# Patient Record
Sex: MALE | Race: BLACK OR AFRICAN AMERICAN
[De-identification: names, ages, dates, MRNs, and addresses within clinical notes are randomized per-mention and may not be internally consistent; named-entity substitution may affect disease eponyms.]

---

## 2018-05-03 ENCOUNTER — HOSPITAL ENCOUNTER (EMERGENCY)
Dept: HOSPITAL 72 - EMR | Age: 73
Discharge: HOME | End: 2018-05-03
Payer: COMMERCIAL

## 2018-05-03 VITALS — DIASTOLIC BLOOD PRESSURE: 96 MMHG | SYSTOLIC BLOOD PRESSURE: 167 MMHG

## 2018-05-03 VITALS — WEIGHT: 200 LBS | BODY MASS INDEX: 28 KG/M2 | HEIGHT: 71 IN
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
## 2018-05-03 DIAGNOSIS — S56.912A: Primary | ICD-10-CM

## 2018-05-03 PROCEDURE — 99284 EMERGENCY DEPT VISIT MOD MDM: CPT

## 2018-05-03 NOTE — EMERGENCY ROOM REPORT
History of Present Illness


General


Chief Complaint:  Upper Extremity Injury


Source:  Patient


 (Eliza Jurado)





Present Illness


HPI


72-year-old male presents to the emergency department complaining of 6 out of 

10 in severity localized left forearm pain 2 days.  Patient reports acute 

onset of pain after having a left large load out of a truck on his own 2 days 

ago.  Patient denies bruising, swelling, skin color changes orders temperature 

changes of the extremity.  Patient denies pain elsewhere in the body such as 

chest or jaw.  He denies trauma or fall. Denies numbness tingling or loss of 

sensation or gross motor movements of the extremity. Denies CP, Palpitations, 

LOC, AMS, dizziness, Changes in Vision, Sensation, paresthesias, or a sudden 

severe headache. 


 (Eliza Jurado)


Allergies:  


Coded Allergies:  


     No Known Allergies (Unverified , 5/3/18)





Patient History


Past Medical History:  see triage record


Past Surgical History:  none


Pertinent Family History:  none


Reviewed Nursing Documentation:  PMH: Agreed; PSxH: Agreed (Eliza Jurado)





Nursing Documentation-PMH


Past Medical History:  No Stated History


 (Eliza Jurado)





Review of Systems


All Other Systems:  negative except mentioned in HPI


 (Eliza Jurado)





Physical Exam





Vital Signs








  Date Time  Temp Pulse Resp B/P (MAP) Pulse Ox O2 Delivery O2 Flow Rate FiO2


 


5/3/18 15:46 97.3 91 16 171/98 95 Room Air  





 97.3       








Sp02 EP Interpretation:  reviewed, normal


General Appearance:  no apparent distress, alert, GCS 15, non-toxic


Head:  normocephalic, atraumatic


ENT:  hearing grossly normal, normal voice


Neck:  full range of motion


Respiratory:  lungs clear, normal breath sounds, speaking full sentences


Cardiovascular #1:  regular rate, rhythm, normal capillary refill


Musculoskeletal:  back normal, gait/station normal, normal range of motion, 

tender - TTp to the left brachioradialis area. FROM against resistance with 

some illicitation of pain. normal distal radial pulse.


Neurologic:  alert, oriented x3, responsive, motor strength/tone normal, 

sensory intact, speech normal, grossly normal


Psychiatric:  judgement/insight normal


Skin:  normal color, no rash, warm/dry, well hydrated


Lymphatic:  no adenopathy


 (Eliza Jurado)





Medical Decision Making


PA Attestation


Dr. corcoran is my supervising Physician whom patient management has been 

discussed with. 


 (Eliza Jurado)


Medicare Attestation


The history of Vance Hull has been reviewed and management options for him 

have been examined and discussed by Torsten Coe. I have personally 

examined and interviewed the patient.





 (Torsten Coe MD)


Diagnostic Impression:  


 Primary Impression:  


 Muscle strain of left forearm


 Qualified Codes:  S56.912A - Strain of unspecified muscles, fascia and tendons 

at forearm level, left arm, initial encounter


ER Course


72-year-old male presents to the emergency department complaining of 6 out of 

10 in severity localized left forearm pain 2 days.  Patient reports acute 

onset of pain after having a left large load out of a truck on his own 2 days 

ago.  Patient denies bruising, swelling, skin color changes orders temperature 

changes of the extremity.  Patient denies pain elsewhere in the body such as 

chest or jaw.  He denies trauma or fall. Denies numbness tingling or loss of 

sensation or gross motor movements of the extremity. Denies CP, Palpitations, 

LOC, AMS, dizziness, Changes in Vision, Sensation, paresthesias, or a sudden 

severe headache. 








Ddx considered but are not limited to Fracture, dislocation, contusion,  Sprain/

Strain/Spasm, tendonitis, referred pain.





Vital signs: are WNL, pt. is afebrile


H&PE are most consistent with musculoskeletal injury will perform imaging to r/

o fractures/dislocations. 





ORDERS:





-  X-ray [ ]    - negative for fx, Dislocation, or significant soft tissue 

injury, per preliminary read in ED, and signed by  EVON Jurado, my 

supervising physician has reviewed, and agrees with my interpretation.





ED INTERVENTIONS:





-  [ ]








DISCHARGE: At this time pt. is stable for d/c to home. Will provide printed 

patient care instructions, and any necessary prescriptions. Care plan and 

follow up instructions have been discussed with the patient prior to discharge.


 (Eliza Jurado)





Last Vital Signs








  Date Time  Temp Pulse Resp B/P (MAP) Pulse Ox O2 Delivery O2 Flow Rate FiO2


 


5/3/18 15:46 97.3 91 16 171/98 95 Room Air  





 97.3       








 (Eliza Jurado)


Disposition:  HOME, SELF-CARE


Condition:  Stable


Scripts


Acetaminophen* (TYLENOL EXTRA STRENGTH*) 500 Mg Tablet


500 MG ORAL Q8H, #20 TAB 0 Refills


   Prov: Eliza Jurado         5/3/18 


Diclofenac Sodium (VOLTAREN) 100 Gm Gel..gram.


1 APPLIC TP BID, #100 GM


   Prov: Eliza Jurado         5/3/18


Patient Instructions:  Muscle Pain, Adult, Muscle Strain, Easy-to-Read





Additional Instructions:  


Take medications as directed. 


 ** Follow up with a Primary Care Provider in 3-5 days, even if your symptoms 

have resolved. ** 


--Please review list of primary care clinics, if you do not already have a 

primary care provider





Return sooner to ED if new symptoms occur, or current symptoms become worse. 











- Please note that this Emergency Department Report was dictated using [employer] technology software, occasionally this can lead to 

erroneous entry secondary to interpretation by the dictation equipment.











Eliza Jurado May 3, 2018 16:31


Torsten Coe MD May 7, 2018 14:01